# Patient Record
Sex: FEMALE | Race: WHITE | HISPANIC OR LATINO | URBAN - METROPOLITAN AREA
[De-identification: names, ages, dates, MRNs, and addresses within clinical notes are randomized per-mention and may not be internally consistent; named-entity substitution may affect disease eponyms.]

---

## 2020-07-29 ENCOUNTER — OFFICE VISIT (OUTPATIENT)
Dept: URBAN - METROPOLITAN AREA CLINIC 115 | Facility: CLINIC | Age: 20
End: 2020-07-29

## 2020-07-31 ENCOUNTER — OFFICE VISIT (OUTPATIENT)
Dept: URBAN - METROPOLITAN AREA CLINIC 115 | Facility: CLINIC | Age: 20
End: 2020-07-31

## 2021-09-15 ENCOUNTER — LAB OUTSIDE AN ENCOUNTER (OUTPATIENT)
Dept: URBAN - METROPOLITAN AREA CLINIC 115 | Facility: CLINIC | Age: 21
End: 2021-09-15

## 2021-09-15 ENCOUNTER — WEB ENCOUNTER (OUTPATIENT)
Dept: URBAN - METROPOLITAN AREA CLINIC 115 | Facility: CLINIC | Age: 21
End: 2021-09-15

## 2021-09-15 ENCOUNTER — OFFICE VISIT (OUTPATIENT)
Dept: URBAN - METROPOLITAN AREA CLINIC 115 | Facility: CLINIC | Age: 21
End: 2021-09-15
Payer: COMMERCIAL

## 2021-09-15 DIAGNOSIS — K58.0 IRRITABLE BOWEL SYNDROME WITH DIARRHEA: ICD-10-CM

## 2021-09-15 DIAGNOSIS — R10.84 ABDOMINAL PAIN, GENERALIZED: ICD-10-CM

## 2021-09-15 DIAGNOSIS — K21.00 GASTROESOPHAGEAL REFLUX DISEASE WITH ESOPHAGITIS WITHOUT HEMORRHAGE: ICD-10-CM

## 2021-09-15 DIAGNOSIS — K75.81 NASH (NONALCOHOLIC STEATOHEPATITIS): ICD-10-CM

## 2021-09-15 DIAGNOSIS — E73.9 LACTOSE INTOLERANCE: ICD-10-CM

## 2021-09-15 PROBLEM — 197125005: Status: ACTIVE | Noted: 2021-09-15

## 2021-09-15 PROBLEM — 442685003: Status: ACTIVE | Noted: 2021-09-15

## 2021-09-15 PROBLEM — 301715003: Status: ACTIVE | Noted: 2021-09-15

## 2021-09-15 PROBLEM — 782415009: Status: ACTIVE | Noted: 2021-09-15

## 2021-09-15 PROBLEM — 62315008: Status: ACTIVE | Noted: 2021-09-15

## 2021-09-15 PROBLEM — 266433003: Status: ACTIVE | Noted: 2021-09-15

## 2021-09-15 PROBLEM — 10743008: Status: ACTIVE | Noted: 2021-09-15

## 2021-09-15 PROCEDURE — 99244 OFF/OP CNSLTJ NEW/EST MOD 40: CPT | Performed by: INTERNAL MEDICINE

## 2021-09-15 RX ORDER — HYOSCYAMINE SULFATE 0.12 MG/1
1 TABLET UNDER THE TONGUE AND ALLOW TO DISSOLVE  AS NEEDED TABLET SUBLINGUAL THREE TIMES A DAY
Qty: 60 TABLET | Refills: 1 | OUTPATIENT
Start: 2021-09-15 | End: 2021-11-13

## 2021-09-15 RX ORDER — CLARITHROMYCIN 500 MG/1
TAKE 1 TABLET (500 MG) BY ORAL ROUTE EVERY 12 HOURS FOR 14 DAYS TABLET, FILM COATED ORAL 2
Qty: 28 | Refills: 0 | Status: ON HOLD | COMMUNITY
Start: 2017-06-10

## 2021-09-15 RX ORDER — NORGESTIMATE AND ETHINYL ESTRADIOL 0.25-0.035
TAKE 1 TABLET BY ORAL ROUTE ONCE DAILY KIT ORAL 1
Qty: 0 | Refills: 0 | Status: ON HOLD | COMMUNITY
Start: 1900-01-01

## 2021-09-15 RX ORDER — IBUPROFEN 100 MG/1
TAKE 2 TABLETS (200 MG) BY ORAL ROUTE EVERY 6 HOURS AS NEEDED WITH FOOD TABLET, COATED ORAL
Qty: 0 | Refills: 0 | Status: ON HOLD | COMMUNITY
Start: 1900-01-01

## 2021-09-15 RX ORDER — PROMETHAZINE HYDROCHLORIDE 12.5 MG/1
1 TABLET AS NEEDED TABLET ORAL
Qty: 30 TABLET | Refills: 1 | OUTPATIENT
Start: 2021-09-15 | End: 2021-10-13

## 2021-09-15 RX ORDER — OMEPRAZOLE 40 MG/1
TAKE 1 CAPSULE BY ORAL ROUTE DAILY FOR 30 DAYS CAPSULE, DELAYED RELEASE PELLETS ORAL 1
Qty: 30 | Refills: 2 | Status: ON HOLD | COMMUNITY
Start: 2017-05-23

## 2021-09-15 RX ORDER — AMOXICILLIN 500 MG/1
TAKE 2 CAPSULES (1,000 MG) BY ORAL ROUTE EVERY 12 HOURS IN COMBINATION WITH ACID REDUCER AND CLARITHROMYCIN FOR 14 DAYS CAPSULE ORAL 2
Qty: 56 | Refills: 0 | Status: ON HOLD | COMMUNITY
Start: 2017-06-10

## 2021-09-15 NOTE — HPI-TODAY'S VISIT:
Ms. Kati Mas is a 20-year-old female who was last seen in 2017 came in today for the complaints of having significant epigastric abdominal pain nausea vomiting as well as loose stools.  She was last seen in 2017 at that time she was diagnosed with erosive gastritis as well as [place].  Patient was treated for H. pylori gastritis and eradication was confirmed.  Last year she went to see [facility] because of insurance issues.  She also had a colonoscopy for complaints of having loose stools and cramping.  Patient was told to have unremarkable colonoscopy.  She reports having episodes of abdominal cramping nausea vomiting headache as well as cold sweats and significant sweating.  Followed by multiple bowel movements in the episodes last for an hour or so.  Patient stated she has been trying to avoid certain foods however she is not able to avoid daily completely.  She she admits for having more symptoms when she eats daily.  She also reports having nausea vomiting intermittently for 3 to 4 days during these episodes.  Denies any constipation in general.  She recently went to the emergency room in February 2021.  She occasionally takes NSAIDs.  She had reports having a colonoscopy last year which was told to be unremarkable.  She also reports having reflux which has been overall controlled recently but that she has a history of still have epigastric fullness and burning sensation when she eats spicy food.  She is trying to avoid fried food.  Patient denies any unintentional weight loss.  Patient stated she tried to lose weight however it was not successful

## 2021-09-15 NOTE — HPI-TODAY'S VISIT:
This patient was referred by Jason Aviles for evaluation of abdominal pain ,nausea ,vomiting and diarrhea . The copy of this note will be sent to the referring provider.

## 2021-09-18 LAB
A/G RATIO: 1.4
ALBUMIN: 4
ALKALINE PHOSPHATASE: 91
ALT (SGPT): 26
AST (SGOT): 22
BASO (ABSOLUTE): 0.1
BASOS: 1
BILIRUBIN, TOTAL: <0.2
BUN/CREATININE RATIO: 18
BUN: 14
CALCIUM: 9.3
CARBON DIOXIDE, TOTAL: 24
CHLORIDE: 106
CREATININE: 0.77
DEAMIDATED GLIADIN ABS, IGA: 4
DEAMIDATED GLIADIN ABS, IGG: 4
EGFR IF AFRICN AM: 129
EGFR IF NONAFRICN AM: 112
ENDOMYSIAL ANTIBODY IGA: NEGATIVE
EOS (ABSOLUTE): 0
EOS: 0
F001-IGE EGG WHITE: <0.1
F002-IGE MILK: <0.1
F003-IGE CODFISH: <0.1
F004-IGE WHEAT: <0.1
F013-IGE PEANUT: <0.1
F014-IGE SOYBEAN: <0.1
GLOBULIN, TOTAL: 2.9
GLUCOSE: 89
HEMATOCRIT: 37.6
HEMATOLOGY COMMENTS:: (no result)
HEMOGLOBIN: 11.5
HEP A AB, TOTAL: POSITIVE
HEP B SURFACE AB, QUAL: REACTIVE
IMMATURE CELLS: (no result)
IMMATURE GRANS (ABS): 0
IMMATURE GRANULOCYTES: 0
IMMUNOGLOBULIN A, QN, SERUM: 314
LIPASE: 60
LYMPHS (ABSOLUTE): 2.4
LYMPHS: 29
Lab: (no result)
MCH: 24.4
MCHC: 30.6
MCV: 80
MONOCYTES(ABSOLUTE): 0.7
MONOCYTES: 8
NEUTROPHILS (ABSOLUTE): 4.9
NEUTROPHILS: 62
NRBC: (no result)
PLATELETS: 273
POTASSIUM: 4.4
PROTEIN, TOTAL: 6.9
RBC: 4.71
RDW: 14.3
SODIUM: 142
T-TRANSGLUTAMINASE (TTG) IGA: <2
T-TRANSGLUTAMINASE (TTG) IGG: <2
WBC: 8.1

## 2021-09-30 ENCOUNTER — TELEPHONE ENCOUNTER (OUTPATIENT)
Dept: URBAN - METROPOLITAN AREA CLINIC 92 | Facility: CLINIC | Age: 21
End: 2021-09-30

## 2021-09-30 ENCOUNTER — OFFICE VISIT (OUTPATIENT)
Dept: URBAN - METROPOLITAN AREA SURGERY CENTER 13 | Facility: SURGERY CENTER | Age: 21
End: 2021-09-30
Payer: COMMERCIAL

## 2021-09-30 DIAGNOSIS — K29.60 ADENOPAPILLOMATOSIS GASTRICA: ICD-10-CM

## 2021-09-30 DIAGNOSIS — R10.13 ABDOMINAL DISCOMFORT, EPIGASTRIC: ICD-10-CM

## 2021-09-30 DIAGNOSIS — K22.8 OTHER SPECIFIED DISEASES OF ESOPHAGUS: ICD-10-CM

## 2021-09-30 PROCEDURE — G8907 PT DOC NO EVENTS ON DISCHARG: HCPCS | Performed by: INTERNAL MEDICINE

## 2021-09-30 PROCEDURE — 43239 EGD BIOPSY SINGLE/MULTIPLE: CPT | Performed by: INTERNAL MEDICINE

## 2021-09-30 RX ORDER — CLARITHROMYCIN 500 MG/1
TAKE 1 TABLET (500 MG) BY ORAL ROUTE EVERY 12 HOURS FOR 14 DAYS TABLET, FILM COATED ORAL 2
Qty: 28 | Refills: 0 | Status: ON HOLD | COMMUNITY
Start: 2017-06-10

## 2021-09-30 RX ORDER — DICYCLOMINE HYDROCHLORIDE 20 MG/1
TAKE 1/2 TO 1 TAB BY MOUTH 3 TIMES A DAY AS NEEDED FOR CRAMPING ABD PAIN TABLET ORAL
Qty: 60 TABLET | Refills: 1 | OUTPATIENT
Start: 2021-09-30 | End: 2021-11-28

## 2021-09-30 RX ORDER — PROMETHAZINE HYDROCHLORIDE 12.5 MG/1
1 TABLET AS NEEDED TABLET ORAL
Qty: 30 TABLET | Refills: 1 | Status: ACTIVE | COMMUNITY
Start: 2021-09-15 | End: 2021-10-13

## 2021-09-30 RX ORDER — FAMOTIDINE 20 MG/1
1 TABLET AT BEDTIME AS NEEDED TABLET, FILM COATED ORAL ONCE A DAY
Qty: 90 TABLET | Refills: 0 | Status: ACTIVE | COMMUNITY
Start: 2021-09-30

## 2021-09-30 RX ORDER — OMEPRAZOLE 40 MG/1
TAKE 1 CAPSULE BY ORAL ROUTE DAILY FOR 30 DAYS CAPSULE, DELAYED RELEASE PELLETS ORAL 1
Qty: 30 | Refills: 2 | Status: ON HOLD | COMMUNITY
Start: 2017-05-23

## 2021-09-30 RX ORDER — OMEPRAZOLE 40 MG/1
TAKE 1 CAPSULE BY ORAL ROUTE DAILY FOR 30 DAYS CAPSULE, DELAYED RELEASE PELLETS ORAL 1
Qty: 30 | Refills: 2 | Status: ACTIVE | COMMUNITY
Start: 2017-05-23

## 2021-09-30 RX ORDER — AMOXICILLIN 500 MG/1
TAKE 2 CAPSULES (1,000 MG) BY ORAL ROUTE EVERY 12 HOURS IN COMBINATION WITH ACID REDUCER AND CLARITHROMYCIN FOR 14 DAYS CAPSULE ORAL 2
Qty: 56 | Refills: 0 | Status: ON HOLD | COMMUNITY
Start: 2017-06-10

## 2021-09-30 RX ORDER — HYOSCYAMINE SULFATE 0.12 MG/1
1 TABLET UNDER THE TONGUE AND ALLOW TO DISSOLVE  AS NEEDED TABLET SUBLINGUAL THREE TIMES A DAY
Qty: 60 TABLET | Refills: 1 | Status: ACTIVE | COMMUNITY
Start: 2021-09-15 | End: 2021-11-13

## 2021-09-30 RX ORDER — NORGESTIMATE AND ETHINYL ESTRADIOL 0.25-0.035
TAKE 1 TABLET BY ORAL ROUTE ONCE DAILY KIT ORAL 1
Qty: 0 | Refills: 0 | Status: ON HOLD | COMMUNITY
Start: 1900-01-01

## 2021-09-30 RX ORDER — FAMOTIDINE 20 MG/1
1 TABLET AT BEDTIME AS NEEDED TABLET, FILM COATED ORAL ONCE A DAY
Qty: 90 TABLET | Refills: 0 | OUTPATIENT
Start: 2021-09-30

## 2021-09-30 RX ORDER — IBUPROFEN 100 MG/1
TAKE 2 TABLETS (200 MG) BY ORAL ROUTE EVERY 6 HOURS AS NEEDED WITH FOOD TABLET, COATED ORAL
Qty: 0 | Refills: 0 | Status: ON HOLD | COMMUNITY
Start: 1900-01-01

## 2021-09-30 RX ORDER — OMEPRAZOLE 40 MG/1
TAKE 1 CAPSULE BY ORAL ROUTE DAILY FOR 30 DAYS CAPSULE, DELAYED RELEASE PELLETS ORAL 1
Qty: 30 | Refills: 2
Start: 2017-05-23

## 2022-10-07 ENCOUNTER — TELEPHONE ENCOUNTER (OUTPATIENT)
Dept: URBAN - METROPOLITAN AREA CLINIC 92 | Facility: CLINIC | Age: 22
End: 2022-10-07

## 2022-10-07 ENCOUNTER — OFFICE VISIT (OUTPATIENT)
Dept: URBAN - METROPOLITAN AREA TELEHEALTH 2 | Facility: TELEHEALTH | Age: 22
End: 2022-10-07

## 2022-10-07 RX ORDER — IBUPROFEN 100 MG/1
TAKE 2 TABLETS (200 MG) BY ORAL ROUTE EVERY 6 HOURS AS NEEDED WITH FOOD TABLET, COATED ORAL
Qty: 0 | Refills: 0 | Status: ON HOLD | COMMUNITY
Start: 1900-01-01

## 2022-10-07 RX ORDER — OMEPRAZOLE 40 MG/1
TAKE 1 CAPSULE BY ORAL ROUTE DAILY FOR 30 DAYS CAPSULE, DELAYED RELEASE PELLETS ORAL 1
Qty: 30 | Refills: 2 | Status: ACTIVE | COMMUNITY
Start: 2017-05-23

## 2022-10-07 RX ORDER — FAMOTIDINE 20 MG/1
1 TABLET AT BEDTIME AS NEEDED TABLET, FILM COATED ORAL ONCE A DAY
Qty: 90 TABLET | Refills: 0 | Status: ACTIVE | COMMUNITY
Start: 2021-09-30

## 2022-10-07 RX ORDER — AMOXICILLIN 500 MG/1
TAKE 2 CAPSULES (1,000 MG) BY ORAL ROUTE EVERY 12 HOURS IN COMBINATION WITH ACID REDUCER AND CLARITHROMYCIN FOR 14 DAYS CAPSULE ORAL 2
Qty: 56 | Refills: 0 | Status: ON HOLD | COMMUNITY
Start: 2017-06-10

## 2022-10-07 RX ORDER — NORGESTIMATE AND ETHINYL ESTRADIOL 0.25-0.035
TAKE 1 TABLET BY ORAL ROUTE ONCE DAILY KIT ORAL 1
Qty: 0 | Refills: 0 | Status: ON HOLD | COMMUNITY
Start: 1900-01-01

## 2022-10-07 RX ORDER — CLARITHROMYCIN 500 MG/1
TAKE 1 TABLET (500 MG) BY ORAL ROUTE EVERY 12 HOURS FOR 14 DAYS TABLET, FILM COATED ORAL 2
Qty: 28 | Refills: 0 | Status: ON HOLD | COMMUNITY
Start: 2017-06-10

## 2022-10-26 ENCOUNTER — OFFICE VISIT (OUTPATIENT)
Dept: URBAN - METROPOLITAN AREA CLINIC 115 | Facility: CLINIC | Age: 22
End: 2022-10-26

## 2024-02-14 ENCOUNTER — OV EP (OUTPATIENT)
Dept: URBAN - METROPOLITAN AREA CLINIC 115 | Facility: CLINIC | Age: 24
End: 2024-02-14
Payer: COMMERCIAL

## 2024-02-14 VITALS
BODY MASS INDEX: 37.65 KG/M2 | TEMPERATURE: 97.9 F | HEIGHT: 65 IN | SYSTOLIC BLOOD PRESSURE: 105 MMHG | HEART RATE: 78 BPM | DIASTOLIC BLOOD PRESSURE: 71 MMHG | WEIGHT: 226 LBS

## 2024-02-14 DIAGNOSIS — R10.32 LLQ ABDOMINAL PAIN: ICD-10-CM

## 2024-02-14 DIAGNOSIS — K75.81 NASH (NONALCOHOLIC STEATOHEPATITIS): ICD-10-CM

## 2024-02-14 DIAGNOSIS — E73.9 LACTOSE INTOLERANCE: ICD-10-CM

## 2024-02-14 DIAGNOSIS — R19.7 DIARRHEA, UNSPECIFIED TYPE: ICD-10-CM

## 2024-02-14 DIAGNOSIS — K58.0 IRRITABLE BOWEL SYNDROME WITH DIARRHEA: ICD-10-CM

## 2024-02-14 DIAGNOSIS — R10.12 LUQ ABDOMINAL PAIN: ICD-10-CM

## 2024-02-14 DIAGNOSIS — K21.00 GASTROESOPHAGEAL REFLUX DISEASE WITH ESOPHAGITIS WITHOUT HEMORRHAGE: ICD-10-CM

## 2024-02-14 PROCEDURE — 99214 OFFICE O/P EST MOD 30 MIN: CPT | Performed by: INTERNAL MEDICINE

## 2024-02-14 RX ORDER — AMOXICILLIN 500 MG/1
TAKE 2 CAPSULES (1,000 MG) BY ORAL ROUTE EVERY 12 HOURS IN COMBINATION WITH ACID REDUCER AND CLARITHROMYCIN FOR 14 DAYS CAPSULE ORAL 2
Qty: 56 | Refills: 0 | Status: DISCONTINUED | COMMUNITY
Start: 2017-06-10

## 2024-02-14 RX ORDER — DICYCLOMINE HYDROCHLORIDE 10 MG/1
1 CAPSULE CAPSULE ORAL
Qty: 90 CAPSULE | Refills: 1 | OUTPATIENT
Start: 2024-02-14 | End: 2024-04-14

## 2024-02-14 RX ORDER — OMEPRAZOLE 40 MG/1
TAKE 1 CAPSULE BY ORAL ROUTE DAILY FOR 30 DAYS CAPSULE, DELAYED RELEASE PELLETS ORAL 1
Qty: 30 | Refills: 2 | Status: DISCONTINUED | COMMUNITY
Start: 2017-05-23

## 2024-02-14 RX ORDER — IBUPROFEN 100 MG/1
TAKE 2 TABLETS (200 MG) BY ORAL ROUTE EVERY 6 HOURS AS NEEDED WITH FOOD TABLET, COATED ORAL
Qty: 0 | Refills: 0 | Status: DISCONTINUED | COMMUNITY
Start: 1900-01-01

## 2024-02-14 RX ORDER — NORGESTIMATE AND ETHINYL ESTRADIOL 0.25-0.035
TAKE 1 TABLET BY ORAL ROUTE ONCE DAILY KIT ORAL 1
Qty: 0 | Refills: 0 | Status: DISCONTINUED | COMMUNITY
Start: 1900-01-01

## 2024-02-14 RX ORDER — FAMOTIDINE 20 MG/1
1 TABLET AT BEDTIME AS NEEDED TABLET, FILM COATED ORAL ONCE A DAY
Qty: 90 TABLET | Refills: 0 | Status: DISCONTINUED | COMMUNITY
Start: 2021-09-30

## 2024-02-14 RX ORDER — CLARITHROMYCIN 500 MG/1
TAKE 1 TABLET (500 MG) BY ORAL ROUTE EVERY 12 HOURS FOR 14 DAYS TABLET, FILM COATED ORAL 2
Qty: 28 | Refills: 0 | Status: DISCONTINUED | COMMUNITY
Start: 2017-06-10

## 2024-02-14 RX ORDER — OMEPRAZOLE 40 MG/1
1 CAPSULE 30 MINUTES BEFORE MORNING MEAL CAPSULE, DELAYED RELEASE ORAL ONCE A DAY
Qty: 30 CAPSULE | Refills: 1 | OUTPATIENT
Start: 2024-02-14

## 2024-02-14 NOTE — HPI-TODAY'S VISIT:
Ms. Kati Mas is a 20-year-old female who was last seen in 2017 came in today for the complaints of having significant epigastric abdominal pain nausea vomiting as well as loose stools.  She was last seen in 2017 at that time she was diagnosed with erosive gastritis as well as [place].  Patient was treated for H. pylori gastritis and eradication was confirmed.  Last year she went to see [facility] because of insurance issues.  She also had a colonoscopy for complaints of having loose stools and cramping.  Patient was told to have unremarkable colonoscopy.  She reports having episodes of abdominal cramping nausea vomiting headache as well as cold sweats and significant sweating.  Followed by multiple bowel movements in the episodes last for an hour or so.  Patient stated she has been trying to avoid certain foods however she is not able to avoid daily completely.  She she admits for having more symptoms when she eats daily.  She also reports having nausea vomiting intermittently for 3 to 4 days during these episodes.  Denies any constipation in general.  She recently went to the emergency room in February 2021.  She occasionally takes NSAIDs.  She had reports having a colonoscopy last year which was told to be unremarkable.  She also reports having reflux which has been overall controlled recently but that she has a history of still have epigastric fullness and burning sensation when she eats spicy food.  She is trying to avoid fried food.  Patient denies any unintentional weight loss.  Patient stated she tried to lose weight however it was not successful  2/14/24; 23year-old female came into the office with complaints of having significant acid reflux as well as epigastric fullness and postprandial discomfort and pain.  Patient reports that having loose stools postprandially.  Denies any unintentional weight loss no new medications and she has been on birth control for over 1 year.  She reports having a child child in November 2022 and ever since she has had some indigestion and epigastric fullness.  She reports having cholecystectomy in 2017.  Denies any bright red per rectum denies any weight loss she was told to have low hemoglobin however she also reports having occasional heavy cycles.  Denies any joint pains or arthritis.

## 2024-02-15 LAB
A/G RATIO: 1.3
ABSOLUTE BASOPHILS: 43
ABSOLUTE EOSINOPHILS: 17
ABSOLUTE LYMPHOCYTES: 4063
ABSOLUTE MONOCYTES: 680
ABSOLUTE NEUTROPHILS: 3698
ALBUMIN: 3.8
ALKALINE PHOSPHATASE: 76
ALT (SGPT): 21
AST (SGOT): 11
BASOPHILS: 0.5
BILIRUBIN, TOTAL: 0.1
BUN/CREATININE RATIO: (no result)
BUN: 15
CALCIUM: 9
CARBON DIOXIDE, TOTAL: 24
CHLORIDE: 106
CREATININE: 0.72
EGFR: 120
EOSINOPHILS: 0.2
GLOBULIN, TOTAL: 3
GLUCOSE: 73
HEMATOCRIT: 34
HEMOGLOBIN: 10.4
IMMUNOGLOBULIN A: 335
LYMPHOCYTES: 47.8
MCH: 21.6
MCHC: 30.6
MCV: 70.7
MONOCYTES: 8
MPV: 11.3
NEUTROPHILS: 43.5
PLATELET COUNT: 301
POTASSIUM: 4
PROTEIN, TOTAL: 6.8
RDW: 16.5
RED BLOOD CELL COUNT: 4.81
SODIUM: 142
TISSUE TRANSGLUTAMINASE AB, IGA: <1
TSH: 3.57
WHITE BLOOD CELL COUNT: 8.5

## 2024-11-13 ENCOUNTER — OFFICE VISIT (OUTPATIENT)
Dept: URBAN - METROPOLITAN AREA CLINIC 115 | Facility: CLINIC | Age: 24
End: 2024-11-13

## 2025-05-14 ENCOUNTER — DASHBOARD ENCOUNTERS (OUTPATIENT)
Age: 25
End: 2025-05-14

## 2025-05-14 ENCOUNTER — OFFICE VISIT (OUTPATIENT)
Dept: URBAN - METROPOLITAN AREA CLINIC 115 | Facility: CLINIC | Age: 25
End: 2025-05-14

## 2025-05-14 RX ORDER — OMEPRAZOLE 40 MG/1
1 CAPSULE 30 MINUTES BEFORE MORNING MEAL CAPSULE, DELAYED RELEASE ORAL ONCE A DAY
Qty: 30 CAPSULE | Refills: 1 | Status: ACTIVE | COMMUNITY
Start: 2024-02-14